# Patient Record
(demographics unavailable — no encounter records)

---

## 2025-07-17 NOTE — HISTORY OF PRESENT ILLNESS
[FreeTextEntry1] : Patient is a 66yo F here for breast cancer screening. Previously followed L breast lump. No family hx of breast or ovarian cancer. Patient denies palpable masses, skin changes, or nipple discharge bilaterally.  4/10/19: B/L MG- no evidence of malignancy 6/29/20: B/L MG- UNRULY. 6/30/21: B/L MG- Fibroglandular. Benign cals. R central asymmetry, stable. BIRADS 2.  6/30/22: B/L MG- scattered fibroglandular. UNRULY. BI-RADS 1 7/6/23: B/l MG- scattered fibroglandular. UNRULY. BIRADS 1. 7/15/24: B/l MG-scattered fibroglandular.  UNRULY BI-RADS 1 7/17/2025 B/L MG-scattered fibroglandular.  UNRULY.  BI-RADS 1

## 2025-07-17 NOTE — PAST MEDICAL HISTORY
[Menarche Age ____] : age at menarche was [unfilled] [Menopause Age____] : age at menopause was [unfilled] [Total Preg ___] : G[unfilled] [Live Births ___] : P[unfilled]  [Age At Live Birth ___] : Age at live birth: [unfilled] [History of Hormone Replacement Treatment] : has no history of hormone replacement treatment [FreeTextEntry5] : none [FreeTextEntry6] : none [FreeTextEntry7] : none [FreeTextEntry8] : none

## 2025-07-17 NOTE — PHYSICAL EXAM
[Normocephalic] : normocephalic [EOMI] : extra ocular movement intact [Supple] : supple [No Supraclavicular Adenopathy] : no supraclavicular adenopathy [No Cervical Adenopathy] : no cervical adenopathy [de-identified] : Bilateral breast/axilla/supraclavicular area: No masses, discharge, or adenopathy\par

## 2025-07-17 NOTE — PHYSICAL EXAM
[Normocephalic] : normocephalic [EOMI] : extra ocular movement intact [Supple] : supple [No Supraclavicular Adenopathy] : no supraclavicular adenopathy [No Cervical Adenopathy] : no cervical adenopathy [de-identified] : Bilateral breast/axilla/supraclavicular area: No masses, discharge, or adenopathy\par

## 2025-07-29 NOTE — LETTER BODY
[Dear  ___] : Dear  [unfilled], [FreeTextEntry2] : Gene Connolly MD [address] [address]  [FreeTextEntry1] : I saw  FELIPE TRONCOSO back in follow up today. Please see the attached note for full details.  Thank you very much for allowing me to participate in the care of this patient. If you have any questions please feel free to call me at any time.    Sincerely yours,    Fabricio Ramsey MD, SRI Director, Male Fertility and Microsurgery  of [facility]

## 2025-07-29 NOTE — HISTORY OF PRESENT ILLNESS
[FreeTextEntry1] : FELIPE TRONCOSO is a 67 year F PMH: microhematuria, hypothyroidism, HTN HCTZ 25, Amlodipine 5 mg, LT4 75mg  Patient presents for repeat UA by PCP (Dr. Gene Mcdaniel) with microscopic hematuria (records not available for review). Has never seen blood in urine. No discomfort with urination, denies frequency or urgency. No previous kidney stones. Reports 1 UTI last year but none more recently.   non smoker but 2nd hand exposure from  [employer] socially  Labs: cysto 1/2024: unremarkable renal US 1/2024: 2cm right upper pole simple cyst, otherwise WNL UA 12/15/2023: RBCs 12 cytology 12/2023 negative Ucx negative  UA 2/2023: RBCs 5/HPF cytology 2023: NEGATIVE FOR HIGH GRADE UROTHELIAL CARCINOMA  cysto 1/2020 negative CT urogram 2020 negative  UA from 12/2019 shows 19 RBC/HPF